# Patient Record
Sex: FEMALE | Race: WHITE | URBAN - METROPOLITAN AREA
[De-identification: names, ages, dates, MRNs, and addresses within clinical notes are randomized per-mention and may not be internally consistent; named-entity substitution may affect disease eponyms.]

---
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
## 2024-02-15 ENCOUNTER — APPOINTMENT (OUTPATIENT)
Dept: GENERAL RADIOLOGY | Age: 24
End: 2024-02-15
Payer: COMMERCIAL

## 2024-02-15 ENCOUNTER — HOSPITAL ENCOUNTER (EMERGENCY)
Age: 24
Discharge: HOME | End: 2024-02-15
Payer: COMMERCIAL

## 2024-02-15 VITALS
RESPIRATION RATE: 17 BRPM | HEIGHT: 66 IN | DIASTOLIC BLOOD PRESSURE: 62 MMHG | WEIGHT: 165 LBS | BODY MASS INDEX: 26.52 KG/M2 | SYSTOLIC BLOOD PRESSURE: 127 MMHG | TEMPERATURE: 98.9 F | HEART RATE: 79 BPM | OXYGEN SATURATION: 100 %

## 2024-02-15 DIAGNOSIS — S92.502A CLOSED FRACTURE OF PHALANX OF LEFT SECOND TOE, INITIAL ENCOUNTER: Primary | ICD-10-CM

## 2024-02-15 DIAGNOSIS — S92.502A CLOSED FRACTURE OF PHALANX OF LEFT THIRD TOE, INITIAL ENCOUNTER: ICD-10-CM

## 2024-02-15 PROCEDURE — 99283 EMERGENCY DEPT VISIT LOW MDM: CPT

## 2024-02-15 PROCEDURE — 73630 X-RAY EXAM OF FOOT: CPT

## 2024-02-15 PROCEDURE — 6370000000 HC RX 637 (ALT 250 FOR IP): Performed by: NURSE PRACTITIONER

## 2024-02-15 RX ORDER — IBUPROFEN 600 MG/1
600 TABLET ORAL 4 TIMES DAILY PRN
Qty: 40 TABLET | Refills: 0 | Status: SHIPPED | OUTPATIENT
Start: 2024-02-15

## 2024-02-15 RX ORDER — IBUPROFEN 600 MG/1
600 TABLET ORAL ONCE
Status: COMPLETED | OUTPATIENT
Start: 2024-02-15 | End: 2024-02-15

## 2024-02-15 RX ORDER — HYDROCODONE BITARTRATE AND ACETAMINOPHEN 5; 325 MG/1; MG/1
1 TABLET ORAL EVERY 4 HOURS PRN
Qty: 18 TABLET | Refills: 0 | Status: SHIPPED | OUTPATIENT
Start: 2024-02-15 | End: 2024-02-18

## 2024-02-15 RX ADMIN — IBUPROFEN 600 MG: 600 TABLET, FILM COATED ORAL at 21:29

## 2024-02-16 ENCOUNTER — TELEPHONE (OUTPATIENT)
Dept: ORTHOPEDIC SURGERY | Age: 24
End: 2024-02-16

## 2024-02-16 NOTE — TELEPHONE ENCOUNTER
Spoke with a family member. Requested that he give her a message to call [phone] to schedule and appointment.     Upon return phone call, Please schedule Next week in an open slot.

## 2024-02-16 NOTE — ED PROVIDER NOTES
with physical exam findings and an explanation what evaluation studies were necessary and why. Labs and Imaging results were explained to the patient in detail, including explanation of what these results mean. All treatment and disposition options were discussed with the patient and a treatment plan with the patient's best short and long term care was made in collaboration with the patient.    I did consider ct foot    Appropriate for outpatient management follow up      I am the Primary Clinician of Record.    FINAL IMPRESSION      1. Closed fracture of phalanx of left second toe, initial encounter    2. Closed fracture of phalanx of left third toe, initial encounter          DISPOSITION/PLAN     DISPOSITION Decision To Discharge 02/15/2024 10:39:33 PM      PATIENT REFERRED TO:  Paulo Jean MD  [address] [place], [place] [id number]  [phone]    Schedule an appointment as soon as possible for a visit       Paulo Jean MD  [address] [place] [id number]  [phone]            DISCHARGE MEDICATIONS:  Discharge Medication List as of 2/15/2024 10:49 PM        START taking these medications    Details   HYDROcodone-acetaminophen (NORCO) 5-325 MG per tablet Take 1 tablet by mouth every 4 hours as needed for Pain for up to 3 days. Intended supply: 3 days. Take lowest dose possible to manage pain Max Daily Amount: 6 tablets, Disp-18 tablet, R-0Print      ibuprofen (ADVIL;MOTRIN) 600 MG tablet Take 1 tablet by mouth 4 times daily as needed for Pain, Disp-40 tablet, R-0Print             DISCONTINUED MEDICATIONS:  Discharge Medication List as of 2/15/2024 10:49 PM                 (Please note that portions of this note were completed with a voice recognition program.  Efforts were made to edit the dictations but occasionally words are mis-transcribed.)    BHAVESH Hough CNP (electronically signed)           Maday Polo APRN - CNP  02/15/24 1847

## 2024-02-17 SDOH — HEALTH STABILITY: PHYSICAL HEALTH: ON AVERAGE, HOW MANY MINUTES DO YOU ENGAGE IN EXERCISE AT THIS LEVEL?: 120 MIN

## 2024-02-17 SDOH — HEALTH STABILITY: PHYSICAL HEALTH: ON AVERAGE, HOW MANY DAYS PER WEEK DO YOU ENGAGE IN MODERATE TO STRENUOUS EXERCISE (LIKE A BRISK WALK)?: 7 DAYS

## 2024-02-20 ENCOUNTER — OFFICE VISIT (OUTPATIENT)
Dept: ORTHOPEDIC SURGERY | Age: 24
End: 2024-02-20

## 2024-02-20 VITALS — WEIGHT: 165 LBS | BODY MASS INDEX: 26.52 KG/M2 | HEIGHT: 66 IN

## 2024-02-20 DIAGNOSIS — S92.912A CLOSED NONDISPLACED FRACTURE OF PHALANX OF TOE OF LEFT FOOT, UNSPECIFIED TOE, INITIAL ENCOUNTER: Primary | ICD-10-CM

## 2024-02-20 NOTE — PROGRESS NOTES
CHIEF COMPLAINT: Left foot pain/ second toe and third toe distal phalanx minimally displaced fracture.    DATE OF INJURY:  2/15/2024    HISTORY:  Ms. Barth 23 y.o. female presents today for the first visit for evaluation of a left foot injury which occurred when she in gym and 65 lb weight fell onto her left foot.   She is complaining of left foot second toe and third toe pain and swelling, 5/10.  This is better with elevation and worse with bearing any wt.  The pain is sharp and not radiating. No other complaint. She was seen 1st at , where she was x-rayed and splinted and asked to f/u with orthopaedics.    No past medical history on file.    No past surgical history on file.    Social History     Socioeconomic History    Marital status: Single     Spouse name: Not on file    Number of children: Not on file    Years of education: Not on file    Highest education level: Not on file   Occupational History    Not on file   Tobacco Use    Smoking status: Never    Smokeless tobacco: Never   Vaping Use    Vaping Use: Never used   Substance and Sexual Activity    Alcohol use: No    Drug use: Not Currently    Sexual activity: Not on file   Other Topics Concern    Not on file   Social History Narrative    Not on file     Social Determinants of Health     Financial Resource Strain: Not on file   Food Insecurity: Not on file   Transportation Needs: Not on file   Physical Activity: Sufficiently Active (2/17/2024)    Exercise Vital Sign     Days of Exercise per Week: 7 days     Minutes of Exercise per Session: 120 min   Stress: Not on file   Social Connections: Not on file   Intimate Partner Violence: Not on file   Housing Stability: Not on file       No family history on file.    Current Outpatient Medications on File Prior to Visit   Medication Sig Dispense Refill    ibuprofen (ADVIL;MOTRIN) 600 MG tablet Take 1 tablet by mouth 4 times daily as needed for Pain 40 tablet 0    Etonogestrel (NEXPLANON SC) Inject into the

## 2024-04-02 ENCOUNTER — OFFICE VISIT (OUTPATIENT)
Dept: ORTHOPEDIC SURGERY | Age: 24
End: 2024-04-02

## 2024-04-02 VITALS — HEIGHT: 66 IN | BODY MASS INDEX: 26.52 KG/M2 | WEIGHT: 165 LBS

## 2024-04-02 DIAGNOSIS — S92.912A CLOSED NONDISPLACED FRACTURE OF PHALANX OF TOE OF LEFT FOOT, UNSPECIFIED TOE, INITIAL ENCOUNTER: Primary | ICD-10-CM

## 2024-04-02 PROCEDURE — APPNB15 APP NON BILLABLE TIME 0-15 MINS: Performed by: NURSE PRACTITIONER

## 2024-04-02 PROCEDURE — 99024 POSTOP FOLLOW-UP VISIT: CPT | Performed by: NURSE PRACTITIONER

## 2024-04-02 NOTE — PROGRESS NOTES
CHIEF COMPLAINT: Left foot pain/ second toe and third toe distal phalanx minimally displaced fracture.    DATE OF INJURY:  2/15/2024    HISTORY:  Ms. Barth 23 y.o. female presents today for follow up visit for evaluation of a left foot injury which occurred when she in gym and 65 lb weight fell onto her left foot.   She states her left foot second toe and third toe pain and swelling is much improved, 0/10. She has been WB in a shoe. No other complaint. She was seen 1st at , where she was x-rayed and splinted and asked to f/u with orthopaedics.    No past medical history on file.    No past surgical history on file.    Social History     Socioeconomic History    Marital status: Single     Spouse name: Not on file    Number of children: Not on file    Years of education: Not on file    Highest education level: Not on file   Occupational History    Not on file   Tobacco Use    Smoking status: Never    Smokeless tobacco: Never   Vaping Use    Vaping Use: Never used   Substance and Sexual Activity    Alcohol use: No    Drug use: Not Currently    Sexual activity: Not on file   Other Topics Concern    Not on file   Social History Narrative    Not on file     Social Determinants of Health     Financial Resource Strain: Not on file   Food Insecurity: Not on file   Transportation Needs: Not on file   Physical Activity: Sufficiently Active (2/17/2024)    Exercise Vital Sign     Days of Exercise per Week: 7 days     Minutes of Exercise per Session: 120 min   Stress: Not on file   Social Connections: Not on file   Intimate Partner Violence: Not on file   Housing Stability: Not on file       No family history on file.    Current Outpatient Medications on File Prior to Visit   Medication Sig Dispense Refill    ibuprofen (ADVIL;MOTRIN) 600 MG tablet Take 1 tablet by mouth 4 times daily as needed for Pain 40 tablet 0    Etonogestrel (NEXPLANON SC) Inject into the skin       No current facility-administered medications on